# Patient Record
Sex: MALE | Race: BLACK OR AFRICAN AMERICAN | NOT HISPANIC OR LATINO | URBAN - METROPOLITAN AREA
[De-identification: names, ages, dates, MRNs, and addresses within clinical notes are randomized per-mention and may not be internally consistent; named-entity substitution may affect disease eponyms.]

---

## 2020-09-20 ENCOUNTER — EMERGENCY (EMERGENCY)
Facility: HOSPITAL | Age: 45
LOS: 0 days | Discharge: HOME | End: 2020-09-20
Attending: STUDENT IN AN ORGANIZED HEALTH CARE EDUCATION/TRAINING PROGRAM
Payer: SELF-PAY

## 2020-09-20 VITALS
RESPIRATION RATE: 20 BRPM | HEART RATE: 78 BPM | SYSTOLIC BLOOD PRESSURE: 130 MMHG | HEIGHT: 70 IN | OXYGEN SATURATION: 98 % | TEMPERATURE: 98 F | WEIGHT: 164.91 LBS | DIASTOLIC BLOOD PRESSURE: 86 MMHG

## 2020-09-20 VITALS
RESPIRATION RATE: 18 BRPM | OXYGEN SATURATION: 96 % | SYSTOLIC BLOOD PRESSURE: 132 MMHG | TEMPERATURE: 98 F | DIASTOLIC BLOOD PRESSURE: 79 MMHG | HEART RATE: 83 BPM
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
## 2020-09-20 DIAGNOSIS — S00.511A ABRASION OF LIP, INITIAL ENCOUNTER: ICD-10-CM
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
## 2020-09-20 DIAGNOSIS — S06.0X1A CONCUSSION WITH LOSS OF CONSCIOUSNESS OF 30 MINUTES OR LESS, INITIAL ENCOUNTER: ICD-10-CM
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
## 2020-09-20 DIAGNOSIS — S00.81XA ABRASION OF OTHER PART OF HEAD, INITIAL ENCOUNTER: ICD-10-CM

## 2020-09-20 LAB
ALBUMIN SERPL ELPH-MCNC: 3.7 G/DL — SIGNIFICANT CHANGE UP (ref 3.3–5)
ALP SERPL-CCNC: 64 U/L — SIGNIFICANT CHANGE UP (ref 40–120)
ALT FLD-CCNC: 36 U/L — SIGNIFICANT CHANGE UP (ref 12–78)
ANION GAP SERPL CALC-SCNC: 6 MMOL/L — SIGNIFICANT CHANGE UP (ref 5–17)
APTT BLD: 35 SEC — SIGNIFICANT CHANGE UP (ref 27.5–35.5)
AST SERPL-CCNC: 29 U/L — SIGNIFICANT CHANGE UP (ref 15–37)
BASOPHILS # BLD AUTO: 0.05 K/UL — SIGNIFICANT CHANGE UP (ref 0–0.2)
BASOPHILS NFR BLD AUTO: 0.7 % — SIGNIFICANT CHANGE UP (ref 0–2)
BILIRUB SERPL-MCNC: 0.8 MG/DL — SIGNIFICANT CHANGE UP (ref 0.2–1.2)
BUN SERPL-MCNC: 28 MG/DL — HIGH (ref 7–23)
CALCIUM SERPL-MCNC: 8.6 MG/DL — SIGNIFICANT CHANGE UP (ref 8.5–10.1)
CHLORIDE SERPL-SCNC: 103 MMOL/L — SIGNIFICANT CHANGE UP (ref 96–108)
CO2 SERPL-SCNC: 29 MMOL/L — SIGNIFICANT CHANGE UP (ref 22–31)
CREAT SERPL-MCNC: 1.51 MG/DL — HIGH (ref 0.5–1.3)
EOSINOPHIL # BLD AUTO: 0.66 K/UL — HIGH (ref 0–0.5)
EOSINOPHIL NFR BLD AUTO: 9.8 % — HIGH (ref 0–6)
GLUCOSE SERPL-MCNC: 99 MG/DL — SIGNIFICANT CHANGE UP (ref 70–99)
HCT VFR BLD CALC: 43.7 % — SIGNIFICANT CHANGE UP (ref 39–50)
HGB BLD-MCNC: 13.8 G/DL — SIGNIFICANT CHANGE UP (ref 13–17)
IMM GRANULOCYTES NFR BLD AUTO: 0.1 % — SIGNIFICANT CHANGE UP (ref 0–1.5)
INR BLD: 1.02 RATIO — SIGNIFICANT CHANGE UP (ref 0.88–1.16)
LYMPHOCYTES # BLD AUTO: 2.97 K/UL — SIGNIFICANT CHANGE UP (ref 1–3.3)
LYMPHOCYTES # BLD AUTO: 43.9 % — SIGNIFICANT CHANGE UP (ref 13–44)
MCHC RBC-ENTMCNC: 23.7 PG — LOW (ref 27–34)
MCHC RBC-ENTMCNC: 31.6 GM/DL — LOW (ref 32–36)
MCV RBC AUTO: 75 FL — LOW (ref 80–100)
MONOCYTES # BLD AUTO: 0.53 K/UL — SIGNIFICANT CHANGE UP (ref 0–0.9)
MONOCYTES NFR BLD AUTO: 7.8 % — SIGNIFICANT CHANGE UP (ref 2–14)
NEUTROPHILS # BLD AUTO: 2.54 K/UL — SIGNIFICANT CHANGE UP (ref 1.8–7.4)
NEUTROPHILS NFR BLD AUTO: 37.7 % — LOW (ref 43–77)
NRBC # BLD: 0 /100 WBCS — SIGNIFICANT CHANGE UP (ref 0–0)
PLATELET # BLD AUTO: 248 K/UL — SIGNIFICANT CHANGE UP (ref 150–400)
POTASSIUM SERPL-MCNC: 3.7 MMOL/L — SIGNIFICANT CHANGE UP (ref 3.5–5.3)
POTASSIUM SERPL-SCNC: 3.7 MMOL/L — SIGNIFICANT CHANGE UP (ref 3.5–5.3)
PROT SERPL-MCNC: 7.7 GM/DL — SIGNIFICANT CHANGE UP (ref 6–8.3)
PROTHROM AB SERPL-ACNC: 11.8 SEC — SIGNIFICANT CHANGE UP (ref 10.6–13.6)
RBC # BLD: 5.83 M/UL — HIGH (ref 4.2–5.8)
RBC # FLD: 14.6 % — HIGH (ref 10.3–14.5)
SODIUM SERPL-SCNC: 138 MMOL/L — SIGNIFICANT CHANGE UP (ref 135–145)
WBC # BLD: 6.76 K/UL — SIGNIFICANT CHANGE UP (ref 3.8–10.5)
WBC # FLD AUTO: 6.76 K/UL — SIGNIFICANT CHANGE UP (ref 3.8–10.5)

## 2020-09-20 PROCEDURE — 93010 ELECTROCARDIOGRAM REPORT: CPT

## 2020-09-20 PROCEDURE — 72125 CT NECK SPINE W/O DYE: CPT | Mod: 26

## 2020-09-20 PROCEDURE — 70450 CT HEAD/BRAIN W/O DYE: CPT | Mod: 26

## 2020-09-20 PROCEDURE — 99285 EMERGENCY DEPT VISIT HI MDM: CPT

## 2020-09-20 RX ORDER — ACETAMINOPHEN 500 MG
650 TABLET ORAL ONCE
Refills: 0 | Status: COMPLETED | OUTPATIENT
Start: 2020-09-20 | End: 2020-09-20

## 2020-09-20 RX ADMIN — Medication 650 MILLIGRAM(S): at 20:39

## 2020-09-20 NOTE — ED ADULT TRIAGE NOTE - CHIEF COMPLAINT QUOTE
Pt c/o head injury  , lip laceration x 20minutes and LOC . Co-worker stated pt  pass out for 2 minutes ,they pour water on his face to wake him up. Pt stated he was at work when a piece of broad hit him in the head.

## 2020-09-20 NOTE — ED PROVIDER NOTE - PATIENT PORTAL LINK FT
You can access the FollowMyHealth Patient Portal offered by [facility] by registering at the following website: [email or web address]. By joining [facility]’s FollowMyHealth portal, you will also be able to view your health information using other applications (apps) compatible with our system.

## 2020-09-20 NOTE — ED PROVIDER NOTE - PHYSICAL EXAMINATION
GEN: Awake, alert, interactive, NAD.  HEAD AND NECK: NC/AT. Airway patent. Neck supple. No midline C-spine TTP.   EYES:  Clear b/l. EOMI. PERRL.   ENT: Moist mucus membranes. Linear abrasion to L lower inner lip, no laceration. + mild edema and developing ecchymosis. Dentition intact / aligned.   CARDIAC: Regular rate, regular rhythm. No evident pedal edema.    RESP/CHEST: Normal respiratory effort with no use of accessory muscles or retractions. Clear throughout on auscultation.  ABD: soft, non-distended, non-tender. No rebound, no guarding.   BACK: No midline spinal TTP. No CVAT.   EXTREMITIES: Moving all extremities with no apparent deformities.   SKIN: Warm, dry, intact normal color. No rash.   NEURO: AOx3, CN II-XII grossly intact, no focal deficits.   PSYCH: Appropriate mood and affect. GEN: Awake, alert, interactive, NAD.  HEAD AND NECK: NC/AT. Airway patent. Neck supple. No midline C-spine TTP.   EYES:  Clear b/l. EOMI. PERRL.   ENT: Moist mucus membranes. Linear abrasion to L lower inner lip, no laceration. + mild edema and developing ecchymosis. Dentition intact / aligned.   CARDIAC: Regular rate, regular rhythm. No evident pedal edema.    RESP/CHEST: Normal respiratory effort with no use of accessory muscles or retractions. Clear throughout on auscultation.  ABD: soft, non-distended, non-tender. No rebound, no guarding.   BACK: No midline spinal TTP. No CVAT.   EXTREMITIES: Moving all extremities with no apparent deformities.   SKIN: Warm, dry, intact normal color. No rash. 1cm abrasion mid-forehead, no edema.   NEURO: AOx3, CN II-XII grossly intact, no focal deficits.   PSYCH: Appropriate mood and affect.

## 2020-09-20 NOTE — ED PROVIDER NOTE - OBJECTIVE STATEMENT
46yo M presents to ED s/p LOC after hit in head w/ wood board. 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 ceiling height 7-10ft) onto his head. + LOC x 2minutes, witnessed, co-workers state they splashed water on him until he awoke. Pt currently AAOx4, at baseline. Denies headache, dizziness, vision change, neck pain, n/v. Pt endorses L lower lip pain / swelling. ROS otherwise neg.     No PMH, no PSH, no meds (no AC), NKDA, non smoker. 44yo M presents to ED s/p LOC after hit in head w/ wood board 30 min PTA. 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 ceiling height 7-10ft) onto his head. + LOC x 2minutes, witnessed, co-workers state they splashed water on him until he awoke. Pt currently AAOx4, at baseline. Denies headache, dizziness, vision change, neck pain, n/v. Pt endorses L lower lip pain / swelling. ROS otherwise neg.     No PMH, no PSH, no meds (no AC), NKDA, non smoker.

## 2020-09-20 NOTE — ED PROVIDER NOTE - CARE PLAN
Principal Discharge DX:	Concussion with loss of consciousness of 30 minutes or less, initial encounter

## 2020-09-20 NOTE — ED ADULT NURSE NOTE - OBJECTIVE STATEMENT
Pt presents post witnessed syncopal episode after a 2x4 strip of wood fell on his head.  As per witnesses, pt was out for about 2 minutes. Pt presents frontal hematoma and left inner lip laceration post witnessed syncopal episode after a 2x4 strip of wood fell on his head.  As per witnesses, pt was out for about 2 minutes.  Pt denies dizziness, change in vision, HA, chest pain, difficulty breathing.

## 2020-09-20 NOTE — ED ADULT NURSE NOTE - NSIMPLEMENTINTERV_GEN_ALL_ED
Implemented All Fall Risk Interventions:  [place] to call system. Call bell, personal items and telephone within reach. Instruct patient to call for assistance. Room bathroom lighting operational. Non-slip footwear when patient is off stretcher. Physically safe environment: no spills, clutter or unnecessary equipment. Stretcher in lowest position, wheels locked, appropriate side rails in place. Provide visual cue, wrist band, yellow gown, etc. Monitor gait and stability. Monitor for mental status changes and reorient to person, place, and time. Review medications for side effects contributing to fall risk. Reinforce activity limits and safety measures with patient and family.

## 2020-09-20 NOTE — ED ADULT NURSE NOTE - ED STAT RN HANDOFF DETAILS
Report received from CHEYENNE Boyce. Assessment available on KB. will continue to monitor. on cardiac monitor at bedside. family at bedside

## 2020-09-20 NOTE — ED ADULT TRIAGE NOTE - HEART RATE (BEATS/MIN)
VS: VSS   O2: >90% on room air    Output: Voiding spontaneously and hydrating well    Last BM: 7/27/19    Activity: Up ad cristian    Up for meals? N/a   Skin: CDI ex RLE cellulitis - dry and flaky - hygiene promoted    Pain: Pt denies    CMS: intact   Dressing: CDI    Diet: Regular    LDA: PIV tko in between [place]   Equipment: IV pump and pole    Plan: Continue POC   Additional Info: Pt leaves unit often does not always notify staff        78

## 2020-09-20 NOTE — ED PROVIDER NOTE - NSFOLLOWUPCLINICS_GEN_ALL_ED_FT
[facility] - Primary Care  Primary Care  [address] [place] [address]  Phone: [phone]  Fax:   Follow Up Time: 4-6 Days

## 2020-09-20 NOTE — ED PROVIDER NOTE - CLINICAL SUMMARY MEDICAL DECISION MAKING FREE TEXT BOX
Otherwise healthy 44yo M pw LOC s/p hit in head by falling wood board at work 30min PTA. AFVSS. Pt well appearing, in NAD, laughing w/ co-workers. No midline c-spine TTP, + 1cm abrasion mid forehead, no lac / no swelling, linear abrasion to L lower inner lip, no lac, + mild swelling and developing ecchymosis. D/t extended LOC x 2min, will obtain CT brain, c-spine, CBC, CMP, coags, ECG. Give tylenol, re-eval. Labs w/o significant abnormalities. CT brain / cspine w/o acute pathology. Stable for d/c home. Recommend f/u w/ PCP, recommend NSAIDs / Tylenol as needed for symptomatic relief. Return signs and symptoms d/w pt. He understands and agrees w/ this plan.
